# Patient Record
Sex: FEMALE | Race: WHITE | NOT HISPANIC OR LATINO | URBAN - METROPOLITAN AREA
[De-identification: names, ages, dates, MRNs, and addresses within clinical notes are randomized per-mention and may not be internally consistent; named-entity substitution may affect disease eponyms.]

---

## 2017-04-28 ENCOUNTER — OFFICE VISIT (OUTPATIENT)
Dept: OBGYN | Facility: CLINIC | Age: 47
End: 2017-04-28
Payer: COMMERCIAL

## 2017-04-28 ENCOUNTER — RADIANT APPOINTMENT (OUTPATIENT)
Dept: ULTRASOUND IMAGING | Facility: CLINIC | Age: 47
End: 2017-04-28
Attending: OBSTETRICS & GYNECOLOGY
Payer: COMMERCIAL

## 2017-04-28 VITALS
HEIGHT: 63 IN | DIASTOLIC BLOOD PRESSURE: 78 MMHG | SYSTOLIC BLOOD PRESSURE: 102 MMHG | WEIGHT: 207 LBS | BODY MASS INDEX: 36.68 KG/M2

## 2017-04-28 DIAGNOSIS — N84.1 ENDOCERVICAL POLYP: ICD-10-CM

## 2017-04-28 DIAGNOSIS — N93.8 DUB (DYSFUNCTIONAL UTERINE BLEEDING): ICD-10-CM

## 2017-04-28 DIAGNOSIS — R10.2 PELVIC CRAMPING: ICD-10-CM

## 2017-04-28 DIAGNOSIS — N83.201 CYST OF RIGHT OVARY: ICD-10-CM

## 2017-04-28 DIAGNOSIS — N93.8 DUB (DYSFUNCTIONAL UTERINE BLEEDING): Primary | ICD-10-CM

## 2017-04-28 PROCEDURE — 99202 OFFICE O/P NEW SF 15 MIN: CPT | Mod: 25 | Performed by: OBSTETRICS & GYNECOLOGY

## 2017-04-28 PROCEDURE — 76830 TRANSVAGINAL US NON-OB: CPT | Performed by: OBSTETRICS & GYNECOLOGY

## 2017-04-28 RX ORDER — MEDROXYPROGESTERONE ACETATE 10 MG
10 TABLET ORAL DAILY
Qty: 30 TABLET | Refills: 0 | Status: SHIPPED | OUTPATIENT
Start: 2017-04-28 | End: 2017-10-26

## 2017-04-28 ASSESSMENT — ANXIETY QUESTIONNAIRES
GAD7 TOTAL SCORE: 0
3. WORRYING TOO MUCH ABOUT DIFFERENT THINGS: NOT AT ALL
2. NOT BEING ABLE TO STOP OR CONTROL WORRYING: NOT AT ALL
6. BECOMING EASILY ANNOYED OR IRRITABLE: NOT AT ALL
1. FEELING NERVOUS, ANXIOUS, OR ON EDGE: NOT AT ALL
7. FEELING AFRAID AS IF SOMETHING AWFUL MIGHT HAPPEN: NOT AT ALL
IF YOU CHECKED OFF ANY PROBLEMS ON THIS QUESTIONNAIRE, HOW DIFFICULT HAVE THESE PROBLEMS MADE IT FOR YOU TO DO YOUR WORK, TAKE CARE OF THINGS AT HOME, OR GET ALONG WITH OTHER PEOPLE: NOT DIFFICULT AT ALL
5. BEING SO RESTLESS THAT IT IS HARD TO SIT STILL: NOT AT ALL

## 2017-04-28 ASSESSMENT — PATIENT HEALTH QUESTIONNAIRE - PHQ9: 5. POOR APPETITE OR OVEREATING: NOT AT ALL

## 2017-04-28 NOTE — PROGRESS NOTES
SUBJECTIVE:                                                   Yolis Paul is a 47 year old female who presents to clinic today for the following health issue(s):  Patient presents with:  Abnormal Uterine Bleeding: 3 weeks of bleeding, very heavy, patient used a menstral cup, that fills about 6-14 oz up, severe cramping, patient taking advil      HPI:  NP  Moved from [place] and prior to that [place].     Periods previously monthly, last 3-5d. Then last year skipped march, was in process of moving, thought due to stress. Then in April, started, slowed down, stop and then restart. This went on until September. Then just stopped and returned to monthly. Then 3wks ago started bleeding and has not stopped. Is heavy, crampy. Has tried Advil and this helps some. Has been bleeding through clothes.   In process of putting in menstrual cup, thinks discovered something coming out of uterus.       Patient's last menstrual period was 2017..   Patient is sexually active, No obstetric history on file..  Using none for contraception.    reports that she has never smoked. She has never used smokeless tobacco.    STD testing offered?  Declined - not sexually active    Health maintenance updated:  yes    Today's PHQ-2 Score: No flowsheet data found.  Today's PHQ-9 Score:   PHQ-9 SCORE 2017   Total Score 1     Today's AMINATA-7 Score:   AMINATA-7 SCORE 2017   Total Score 0       Problem list and histories reviewed & adjusted, as indicated.  Additional history: as documented.    There is no problem list on file for this patient.    Past Surgical History:   Procedure Laterality Date     ABDOMINOPLASTY        SECTION       GALLBLADDER SURGERY      L/S      Social History   Substance Use Topics     Smoking status: Never Smoker     Smokeless tobacco: Never Used     Alcohol use Yes      Problem (# of Occurrences) Relation (Name,Age of Onset)    Breast Cancer (1) Maternal Aunt (70)    Fractures (1) Mother (60): Hip.  "Smoker, chronic steriod use.     HEART DISEASE (2) Maternal Grandmother, Paternal Grandmother    Hypertension (2) Maternal Grandmother, Paternal Grandmother    OSTEOPOROSIS (1) Mother    Thyroid Disease (1) Father            Current Outpatient Prescriptions   Medication Sig     medroxyPROGESTERone (PROVERA) 10 MG tablet Take 1 tablet (10 mg) by mouth daily     No current facility-administered medications for this visit.      Allergies   Allergen Reactions     Codeine        ROS:  12 point review of systems negative other than symptoms noted below.  Cardiovascular: Lower Extremity Swelling  Genitourinary: Cramps, Frequency, Heavy Bleeding with Period, Night Sweats and Significant PMS    OBJECTIVE:     /78  Ht 5' 3\" (1.6 m)  Wt 207 lb (93.9 kg)  LMP 04/28/2017  Breastfeeding? No  BMI 36.67 kg/m2  Body mass index is 36.67 kg/(m^2).    Exam:  Constitutional:  Appearance: Well nourished, well developed alert, in no acute distress  Neurologic/Psychiatric:  Mental Status:  Oriented X3   Pelvic Exam:  External Genitalia:     Normal appearance for age, no discharge present, no tenderness present, no inflammatory lesions present, color normal  Vagina:     Normal vaginal vault without central or paravaginal defects, no discharge present, no inflammatory lesions present, no masses present  Bladder:     Nontender to palpation  Urethra:   Urethral Body:  Urethra palpation normal, urethra structural support normal   Urethral Meatus:  No erythema or lesions present  Cervix:     Appearance healthy, nontender to palpation, no bleeding present, [id number] POLYP PROTRUDING FROM OS; DARK BLOOD IN VAULT  Uterus:     Nontender to palpation, no masses present, position anteflexed, mobility: normal  Adnexa:     No adnexal tenderness present, no adnexal masses present  Perineum:     Perineum within normal limits, no evidence of trauma, no rashes or skin lesions present  Anus:     Anus within normal limits, no hemorrhoids " present  Inguinal Lymph Nodes:     No lymphadenopathy present  Pubic Hair:     Normal pubic hair distribution for age  Genitalia and Groin:     No rashes present, no lesions present, no areas of discoloration, no masses present       ASSESSMENT/PLAN:                                                        ICD-10-CM    1. DUB (dysfunctional uterine bleeding) N93.8 US Transvaginal Non OB     medroxyPROGESTERone (PROVERA) 10 MG tablet     US Transvaginal Non OB   2. Pelvic cramping R10.2 US Transvaginal Non OB     medroxyPROGESTERone (PROVERA) 10 MG tablet     US Transvaginal Non OB   3. Endocervical polyp N84.1 medroxyPROGESTERone (PROVERA) 10 MG tablet   4. Cyst of right ovary N83.201 US Transvaginal Non OB         -Pelvic US obtained. Ovarian cyst complex on R noted, no other evidence polyps/fibroids. Discussed monitoring cyst and repeating US in 6-8wk.  -endocervical polyp, large. Discussed in office vs surgical hysteroscopy and polypectomy. Is on larger side, would anticipate some bleeding could ocur which may require further intervention. Pt wishes to try in office. She will make appt.  -DUB, likely secondary to polyp +/-perimenopause. Will stop bleeding with provera in meantime, until can return for polyp removal.     Katelyn Joseph Masters, DO  [facility]

## 2017-04-28 NOTE — MR AVS SNAPSHOT
"              After Visit Summary   4/28/2017    Yolis Paul    MRN: [id number]           Patient Information     Date Of Birth          1970        Visit Information        Provider Department      4/28/2017 9:20 AM Katelyn Henderson DO [facility]        Today's Diagnoses     DUB (dysfunctional uterine bleeding)    -  1    Pelvic cramping        Endocervical polyp           Follow-ups after your visit        Your next 10 appointments already scheduled     May 04, 2017  2:50 PM CDT   Office Visit with Katelyn Henderson DO   [facility]    [address] [place] [id number] [phone]           Bring a current list of meds and any records pertaining to this visit.  For Physicals, please bring immunization records and any forms needing to be filled out.  Please arrive 10 minutes early to complete paperwork.              Who to contact     If you have questions or need follow up information about today's clinic visit or your schedule please contact [facility] directly at [phone].  Normal or non-critical lab and imaging results will be communicated to you by [employer], letter or phone within 4 business days after the clinic has received the results. If you do not hear from us within 7 days, please contact the clinic through [employer] or phone. If you have a critical or abnormal lab result, we will notify you by phone as soon as possible.  Submit refill requests through [employer] or call your pharmacy and they will forward the refill request to us. Please allow 3 business days for your refill to be completed.          Additional Information About Your Visit        MyChart Information     [employer] lets you send messages to your doctor, view your test results, renew your prescriptions, schedule appointments and more. To sign up, go to [email or web address] . Click on \"Log in\" on the left " "side of the screen, which will take you to the Welcome page. Then click on \"Sign up Now\" on the right side of the page.     You will be asked to enter the access code listed below, as well as some personal information. Please follow the directions to create your username and password.     Your access code is: [email or web address]  Expires: 2017 10:34 AM     Your access code will  in 90 days. If you need help or a new code, please call your [facility] [place] clinic or [phone].        Care EveryWhere ID     This is your Care EveryWhere ID. This could be used by other organizations to access your [facility] medical records  [id number]        Your Vitals Were     Height Last Period Breastfeeding? BMI (Body Mass Index)          5' 3\" (1.6 m) 2017 No 36.67 kg/m2         Blood Pressure from Last 3 Encounters:   17 102/78    Weight from Last 3 Encounters:   17 207 lb (93.9 kg)               Primary Care Provider    None Specified       No primary provider on file.        Thank you!     Thank you for choosing [facility]  for your care. Our goal is always to provide you with excellent care. Hearing back from our patients is one way we can continue to improve our services. Please take a few minutes to complete the written survey that you may receive in the mail after your visit with us. Thank you!             Your Updated Medication List - Protect others around you: Learn how to safely use, store and throw away your medicines at www.disposemymeds.org.      Notice  As of 2017 10:34 AM    You have not been prescribed any medications.      "

## 2017-04-29 ASSESSMENT — PATIENT HEALTH QUESTIONNAIRE - PHQ9: SUM OF ALL RESPONSES TO PHQ QUESTIONS 1-9: 1

## 2017-04-29 ASSESSMENT — ANXIETY QUESTIONNAIRES: GAD7 TOTAL SCORE: 0

## 2017-10-26 ENCOUNTER — APPOINTMENT (OUTPATIENT)
Dept: GENERAL RADIOLOGY | Facility: CLINIC | Age: 47
End: 2017-10-26
Attending: EMERGENCY MEDICINE
Payer: COMMERCIAL

## 2017-10-26 ENCOUNTER — HOSPITAL ENCOUNTER (OUTPATIENT)
Facility: CLINIC | Age: 47
Setting detail: OBSERVATION
Discharge: HOME | End: 2017-10-27
Attending: EMERGENCY MEDICINE | Admitting: INTERNAL MEDICINE
Payer: COMMERCIAL

## 2017-10-26 DIAGNOSIS — R07.9 CHEST PAIN: ICD-10-CM

## 2017-10-26 LAB
ANION GAP SERPL CALCULATED.3IONS-SCNC: 7 MMOL/L (ref 3–14)
BASOPHILS # BLD AUTO: 0 10E9/L (ref 0–0.2)
BASOPHILS NFR BLD AUTO: 0.5 %
BUN SERPL-MCNC: 11 MG/DL (ref 7–30)
CALCIUM SERPL-MCNC: 8.6 MG/DL (ref 8.5–10.1)
CHLORIDE SERPL-SCNC: 108 MMOL/L (ref 94–109)
CO2 SERPL-SCNC: 23 MMOL/L (ref 20–32)
CREAT SERPL-MCNC: 0.74 MG/DL (ref 0.52–1.04)
D DIMER PPP FEU-MCNC: 0.3 UG/ML FEU (ref 0–0.5)
DIFFERENTIAL METHOD BLD: NORMAL
EOSINOPHIL # BLD AUTO: 0 10E9/L (ref 0–0.7)
EOSINOPHIL NFR BLD AUTO: 0.6 %
ERYTHROCYTE [DISTWIDTH] IN BLOOD BY AUTOMATED COUNT: 14.1 % (ref 10–15)
GFR SERPL CREATININE-BSD FRML MDRD: 83 ML/MIN/1.7M2
GLUCOSE SERPL-MCNC: 88 MG/DL (ref 70–99)
HCT VFR BLD AUTO: 38 % (ref 35–47)
HGB BLD-MCNC: 13 G/DL (ref 11.7–15.7)
IMM GRANULOCYTES # BLD: 0 10E9/L (ref 0–0.4)
IMM GRANULOCYTES NFR BLD: 0.3 %
INTERPRETATION ECG - MUSE: NORMAL
LYMPHOCYTES # BLD AUTO: 1.8 10E9/L (ref 0.8–5.3)
LYMPHOCYTES NFR BLD AUTO: 28.4 %
MAGNESIUM SERPL-MCNC: 2.1 MG/DL (ref 1.6–2.3)
MCH RBC QN AUTO: 29.7 PG (ref 26.5–33)
MCHC RBC AUTO-ENTMCNC: 34.2 G/DL (ref 31.5–36.5)
MCV RBC AUTO: 87 FL (ref 78–100)
MONOCYTES # BLD AUTO: 0.5 10E9/L (ref 0–1.3)
MONOCYTES NFR BLD AUTO: 8.5 %
NEUTROPHILS # BLD AUTO: 3.9 10E9/L (ref 1.6–8.3)
NEUTROPHILS NFR BLD AUTO: 61.7 %
NRBC # BLD AUTO: 0 10*3/UL
NRBC BLD AUTO-RTO: 0 /100
PLATELET # BLD AUTO: 276 10E9/L (ref 150–450)
POTASSIUM SERPL-SCNC: 3.7 MMOL/L (ref 3.4–5.3)
RBC # BLD AUTO: 4.37 10E12/L (ref 3.8–5.2)
SODIUM SERPL-SCNC: 138 MMOL/L (ref 133–144)
TROPONIN I SERPL-MCNC: <0.015 UG/L (ref 0–0.04)
TROPONIN I SERPL-MCNC: <0.015 UG/L (ref 0–0.04)
TSH SERPL DL<=0.005 MIU/L-ACNC: 2.11 MU/L (ref 0.4–4)
WBC # BLD AUTO: 6.4 10E9/L (ref 4–11)

## 2017-10-26 PROCEDURE — G0378 HOSPITAL OBSERVATION PER HR: HCPCS

## 2017-10-26 PROCEDURE — 84443 ASSAY THYROID STIM HORMONE: CPT | Performed by: EMERGENCY MEDICINE

## 2017-10-26 PROCEDURE — 84484 ASSAY OF TROPONIN QUANT: CPT | Mod: 91 | Performed by: NURSE PRACTITIONER

## 2017-10-26 PROCEDURE — 85379 FIBRIN DEGRADATION QUANT: CPT | Performed by: EMERGENCY MEDICINE

## 2017-10-26 PROCEDURE — 99285 EMERGENCY DEPT VISIT HI MDM: CPT | Mod: 25

## 2017-10-26 PROCEDURE — 83735 ASSAY OF MAGNESIUM: CPT | Performed by: EMERGENCY MEDICINE

## 2017-10-26 PROCEDURE — 80048 BASIC METABOLIC PNL TOTAL CA: CPT | Performed by: EMERGENCY MEDICINE

## 2017-10-26 PROCEDURE — 99207 ZZC CDG-HISTORY COMP: MEETS EXP. PROBLEM FOCUSED-DOWN CODED LACK OF ROS: CPT | Performed by: NURSE PRACTITIONER

## 2017-10-26 PROCEDURE — 99218 ZZC INITIAL OBSERVATION CARE,LEVL I: CPT | Performed by: NURSE PRACTITIONER

## 2017-10-26 PROCEDURE — 25000132 ZZH RX MED GY IP 250 OP 250 PS 637: Performed by: EMERGENCY MEDICINE

## 2017-10-26 PROCEDURE — 93005 ELECTROCARDIOGRAM TRACING: CPT

## 2017-10-26 PROCEDURE — 36415 COLL VENOUS BLD VENIPUNCTURE: CPT | Performed by: NURSE PRACTITIONER

## 2017-10-26 PROCEDURE — 71020 XR CHEST 2 VW: CPT

## 2017-10-26 PROCEDURE — 84484 ASSAY OF TROPONIN QUANT: CPT | Performed by: EMERGENCY MEDICINE

## 2017-10-26 PROCEDURE — 85025 COMPLETE CBC W/AUTO DIFF WBC: CPT | Performed by: EMERGENCY MEDICINE

## 2017-10-26 RX ORDER — ALUMINA, MAGNESIA, AND SIMETHICONE 2400; 2400; 240 MG/30ML; MG/30ML; MG/30ML
15-30 SUSPENSION ORAL EVERY 4 HOURS PRN
Status: DISCONTINUED | OUTPATIENT
Start: 2017-10-26 | End: 2017-10-27 | Stop reason: HOSPADM

## 2017-10-26 RX ORDER — ASPIRIN 81 MG/1
324 TABLET, CHEWABLE ORAL ONCE
Status: COMPLETED | OUTPATIENT
Start: 2017-10-26 | End: 2017-10-26

## 2017-10-26 RX ORDER — ASPIRIN 81 MG/1
162 TABLET, CHEWABLE ORAL ONCE
Status: DISCONTINUED | OUTPATIENT
Start: 2017-10-26 | End: 2017-10-26

## 2017-10-26 RX ORDER — ACETAMINOPHEN 650 MG/1
650 SUPPOSITORY RECTAL EVERY 4 HOURS PRN
Status: DISCONTINUED | OUTPATIENT
Start: 2017-10-26 | End: 2017-10-27 | Stop reason: HOSPADM

## 2017-10-26 RX ORDER — ASPIRIN 81 MG/1
81 TABLET ORAL DAILY
Status: DISCONTINUED | OUTPATIENT
Start: 2017-10-27 | End: 2017-10-27 | Stop reason: HOSPADM

## 2017-10-26 RX ORDER — NALOXONE HYDROCHLORIDE 0.4 MG/ML
.1-.4 INJECTION, SOLUTION INTRAMUSCULAR; INTRAVENOUS; SUBCUTANEOUS
Status: DISCONTINUED | OUTPATIENT
Start: 2017-10-26 | End: 2017-10-27 | Stop reason: HOSPADM

## 2017-10-26 RX ORDER — ACETAMINOPHEN 325 MG/1
650 TABLET ORAL EVERY 4 HOURS PRN
Status: DISCONTINUED | OUTPATIENT
Start: 2017-10-26 | End: 2017-10-27 | Stop reason: HOSPADM

## 2017-10-26 RX ORDER — NITROGLYCERIN 0.4 MG/1
0.4 TABLET SUBLINGUAL EVERY 5 MIN PRN
Status: DISCONTINUED | OUTPATIENT
Start: 2017-10-26 | End: 2017-10-26

## 2017-10-26 RX ORDER — NITROGLYCERIN 0.4 MG/1
0.4 TABLET SUBLINGUAL EVERY 5 MIN PRN
Status: DISCONTINUED | OUTPATIENT
Start: 2017-10-26 | End: 2017-10-27 | Stop reason: HOSPADM

## 2017-10-26 RX ADMIN — ASPIRIN 81 MG 324 MG: 81 TABLET ORAL at 13:50

## 2017-10-26 RX ADMIN — NITROGLYCERIN 0.4 MG: 0.4 TABLET SUBLINGUAL at 13:51

## 2017-10-26 ASSESSMENT — ENCOUNTER SYMPTOMS
DIZZINESS: 1
NAUSEA: 1
ABDOMINAL PAIN: 0
DIAPHORESIS: 0
BACK PAIN: 1
VOMITING: 0
NECK PAIN: 1
PALPITATIONS: 1

## 2017-10-26 NOTE — IP AVS SNAPSHOT
MRN:[id number]                      After Visit Summary   10/26/2017    Yolis Paul    MRN: [id number]           Thank you!     Thank you for choosing [facility] for your care. Our goal is always to provide you with excellent care. Hearing back from our patients is one way we can continue to improve our services. Please take a few minutes to complete the written survey that you may receive in the mail after you visit with us. Thank you!        Patient Information     Date Of Birth          1970        About your hospital stay     You were [facility] on:  October 26, 2017 [facility]    You were discharged on:  October 27, 2017        Reason for your hospital stay       You were registered to observation due to chest pain.                  Who to Call     For medical emergencies, please call 911.  For non-urgent questions about your medical care, please call your primary care provider or clinic, None          Attending Provider     Provider Specialty    Fernando Barnett MD --    Chelsi Fonseca MD Internal Medicine       Primary Care Provider    Physician No Ref-Primary      After Care Instructions     Activity       Your activity upon discharge: activity as tolerated            Diet       Follow this diet upon discharge: Orders Placed This Encounter      Combination Diet Low Saturated Fat Na <2400mg Diet, No Caffeine Diet                  Follow-up Appointments     Follow-up and recommended labs and tests        Follow up with primary care provider, Physician No Ref-Primary, within 7 days for hospital follow- up.  No follow up labs or test are needed.                  Pending Results     No orders found for last 3 day(s).            Statement of Approval     Ordered          10/27/17 1146  I have reviewed and agree with all the recommendations and orders detailed in this document.  EFFECTIVE NOW     Approved and electronically signed by:   "Beck Chun PA-C             Admission Information     Date & Time Provider Department Dept. Phone    10/26/2017 Chelsi Fonseca MD [facility] [phone]      Your Vitals Were     Blood Pressure Pulse Temperature Respirations Height Weight    124/89 (BP Location: Right arm) 94 98.8  F (37.1  C) (Oral) 16 1.626 m (5' 4\") 89.8 kg (198 lb)    Pulse Oximetry BMI (Body Mass Index)                94% 33.99 kg/m2          [employer] Information     [employer] lets you send messages to your doctor, view your test results, renew your prescriptions, schedule appointments and more. To sign up, go to [email or web address] . Click on \"Log in\" on the left side of the screen, which will take you to the Welcome page. Then click on \"Sign up Now\" on the right side of the page.     You will be asked to enter the access code listed below, as well as some personal information. Please follow the directions to create your username and password.     Your access code is: [email or web address]  Expires: 2018 11:54 AM     Your access code will  in 90 days. If you need help or a new code, please call your [facility] clinic or [phone].        Care EveryWhere ID     This is your Care EveryWhere ID. This could be used by other organizations to access your [facility] medical records  [id number]        Equal Access to Services     AZAEL MCNAIR AH: Hadii anaya demarcoo Sorobertali, waaxda luqadaha, qaybta kaalmada almaz, rajan kasper aderoselyn mccarthy . So [facility] [phone].    ATENCIÓN: Si habla español, tiene a ross disposición servicios gratuitos de asistencia lingüística. Llame al [phone].    We comply with applicable federal civil rights laws and [place] laws. We do not discriminate on the basis of race, color, national origin, age, disability, sex, sexual orientation, or gender identity.               Review of your medicines      CONTINUE these medicines which have NOT CHANGED        " Dose / Directions    IBUPROFEN PO        Dose:  200 mg   Take 200 mg by mouth every 8 hours as needed for moderate pain   Refills:  0                Protect others around you: Learn how to safely use, store and throw away your medicines at www.disposemymeds.org.             Medication List: This is a list of all your medications and when to take them. Check marks below indicate your daily home schedule. Keep this list as a reference.      Medications           Morning Afternoon Evening Bedtime As Needed    IBUPROFEN PO   Take 200 mg by mouth every 8 hours as needed for moderate pain

## 2017-10-26 NOTE — H&P
"[facility]    History and Physical  Hospitalist       Date of Admission:  10/26/2017    Assessment & Plan   Yolis Paul is a 47 year old female who presents with chest pain and a PMH of atrial fibrillation.    Chest Pain: the patient reports this chest pain came on suddenly, while the patient was sitting at work. The pain is located mid sternal, radiated to her back, up the right side of her neck and caused pain/numbness down her right arm. The pain continued to worsen, and at its worst was a 7-8/10. She states she has not had anything like this in the past. The pain was associated with nausea, slight lightheadedness, and she felt a little clammy. She endorses increased work stress recently, but there was not a preceding factor or incident to this pain. While this episode was occurring, she was able to have her BP taken which was elevated at 160/105, HR 115s. The pain was alleviated by nitro in the ED. Of note, the 2 days preceding this event, the patient noticed she was having more palpitations than usual. Electrolytes normal. D-dimer normal, CXR normal.  -telemetry  -exercise stress test 10/27  -trend troponins over night  -TSH  -magnesium add on    Atrial Fibrillation: The patient reports she has had one episode of atrial fibrillation approximately 8 years ago. At this time, she was taking an OTC herbal supplement for depression called \"Maco-E\". Apparently a side effect of this medication is atrial fibrillation/arrhythmias. She was hospitalized overnight and stopped taking the medication and subsequently converted on her own without treatment and has not had an episode since.   -telemetry  -TSH/magnesium    DVT Prophylaxis: Low Risk/Ambulatory with no VTE prophylaxis indicated and Pneumatic Compression Devices  Code Status: Full Code    Disposition: Expected discharge in 1 days once stress test is complete, hemodynamically stable.    Jane Coley, CNP    Primary Care Physician   No primary care " provider on file.    Chief Complaint    Chest Pain    History is obtained from the patient    History of Present Illness   Yolis Paul is a 47 year old female who presents with sudden onset chest pain while sitting at a desk at work. The pain is located mid sternal, radiated to her back, up the right side of her neck and caused pain/numbness down her right arm. The pain continued to worsen, and at its worst was a 7-8/10. She states she has not had anything like this in the past. The pain was associated with nausea, slight lightheadedness, and she felt a little clammy. She endorses increased work stress recently, but there was not a preceding factor or incident to this pain. While this episode was occurring, she was able to have her BP taken which was elevated at 160/105, HR 115s. The pain was alleviated by nitro in the ED. Of note, the 2 days preceding this event, the patient noticed she was having more palpitations than usual.    Past Medical History    Atrial fibrillation    Past Surgical History   Abdominoplasty    section  Gallbladder surgery     Prior to Admission Medications   Prior to Admission Medications   Prescriptions Last Dose Informant Patient Reported? Taking?   IBUPROFEN PO prn  Yes Yes   Sig: Take 200 mg by mouth every 8 hours as needed for moderate pain      Facility-Administered Medications: None     Allergies   Allergies   Allergen Reactions     Codeine        Social History   I have reviewed this patient's social history and updated it with pertinent information if needed. Yoils Paul  reports that she has never smoked. She has never used smokeless tobacco. She reports that she drinks alcohol. She reports that she does not use illicit drugs.    Family History   I have reviewed this patient's family history and updated it with pertinent information if needed.   Family History   Problem Relation Age of Onset     OSTEOPOROSIS Mother      Fractures Mother 60     Hip. Smoker, chronic steriod  "use.      Breast Cancer Maternal Aunt 70     Thyroid Disease Father      Hypertension Maternal Grandmother      HEART DISEASE Maternal Grandmother      Hypertension Paternal Grandmother      HEART DISEASE Paternal Grandmother        Review of Systems   CONSTITUTIONAL:  negative for  fevers and chills POSITIVE for occasional \"sweating\" - associates this with menopause  RESPIRATORY:  negative for  dry cough, cough with sputum and dyspnea  CARDIOVASCULAR:  negative for  dyspnea, orthopnea, exertional chest pressure/discomfort, fatigue POSITIVE for chest pain; palpitations  GASTROINTESTINAL:  positive for nausea  GENITOURINARY:  negative  BEHAVIOR/PSYCH:  positive for increased stress    Physical Exam   Temp: 100.2  F (37.9  C) Temp src: Oral BP: 124/87 Pulse: 94 Heart Rate: 80 Resp: 19 SpO2: 96 % O2 Device: None (Room air)    Vital Signs with Ranges  Temp:  [100.2  F (37.9  C)] 100.2  F (37.9  C)  Pulse:  [94] 94  Heart Rate:  [72-93] 80  Resp:  [10-22] 19  BP: (108-139)/(71-93) 124/87  SpO2:  [92 %-96 %] 96 %  198 lbs 0 oz    Constitutional: Awake, alert, cooperative, no apparent distress. Sitting upright in bed, eyes open  Eyes: Conjunctiva and pupils examined and normal.  Respiratory: Clear to auscultation bilaterally, no crackles or wheezing.  Cardiovascular: Regular rate and rhythm, normal S1 and S2, and no murmur noted.  GI: Soft, non-distended, non-tender, normal bowel sounds.  Skin: No rashes  Neurologic: no gross focal neuro deficits, normal strength and sensation.  Psychiatric: Alert, oriented to person, place and time, no obvious anxiety or depression.    Data   Data reviewed today:  I personally reviewed the EKG tracing showing sinus tachycardia.    Recent Labs  Lab 10/26/17  1338   WBC 6.4   HGB 13.0   MCV 87         POTASSIUM 3.7   CHLORIDE 108   CO2 23   BUN 11   CR 0.74   ANIONGAP 7   OLIVIA 8.6   GLC 88   TROPI <0.015       Imaging:  Recent Results (from the past 24 hour(s))   Chest XR,  PA & " LAT    Narrative    XR CHEST 2 VW  10/26/2017 1:50 PM    HISTORY:  chest pain    COMPARISON:  None.      Impression    IMPRESSION:  Negative.     BASSAM JONAS MD

## 2017-10-26 NOTE — ED PROVIDER NOTES
History     Chief Complaint:  Chest Pain    HPI   Yolis Paul is a 47 year old female who presents to the emergency department today for evaluation of chest pain. The patient reports 7/10 chest pain radiating up her neck and to her back with nausea and dizziness, increased blood pressure of 170/110, and increased heart rate starting two hours ago while she was sitting at work. The patient reports similar pain last night with heart skipping palpitations. The patient reports an episode of atrial fibrillation in [id number] from taking medication after her mother passed away, but did not present with chest pain and she reports at that time she cardioverted back on her own. The patient reports pain right now is a 3/10 and there is only mild neck discomfort. The patient reports she has not been admitted or seen for current complaints before. The patient endorses surgery of  section in . The patient drinks alcohol weekly and denies smoking history. The patient denies diaphoresis, leg pain, abdominal pain, or vomiting.    Allergies:  Codeine    Medications:    Provera  Ibuprofen    Past Medical History:    Atrial fibrillation    Past Surgical History:    Abdominoplasty    section  Gallbladder surgery     Family History:    Mother: Osteoporosis, hip fracture  Maternal Aunt: Breast Cancer  Father: Thyroid disease  Maternal Grandmother: Hypertension, Heart Disease  Paternal Grandmother: Hypertension, Heart Disease    Social History:  The patient was accompanied to the ED by colleague.  Smoking Status: Never Smoker  Smokeless Tobacco: Never Used  Alcohol Use: Positive  Marital Status:   [2]     Review of Systems   Constitutional: Negative for diaphoresis.   Cardiovascular: Positive for chest pain and palpitations (resolved). Negative for leg swelling.   Gastrointestinal: Positive for nausea. Negative for abdominal pain and vomiting.   Musculoskeletal: Positive for back pain (radiating from chest pain)  "and neck pain (radiating from chest pain).        No leg pain   Neurological: Positive for dizziness.   All other systems reviewed and are negative.    Physical Exam     Patient Vitals for the past 24 hrs:   BP Temp Temp src Pulse Heart Rate Resp SpO2 Height Weight   10/26/17 1500 124/77 - - - 76 15 94 % - -   10/26/17 1445 113/71 - - - 80 22 94 % - -   10/26/17 1430 121/75 - - - 85 16 95 % - -   10/26/17 1415 108/84 - - - 86 12 95 % - -   10/26/17 1400 (!) 127/93 - - - 93 13 92 % - -   10/26/17 1312 139/87 100.2  F (37.9  C) Oral 94 - 22 95 % 1.626 m (5' 4\") 89.8 kg (198 lb)     Physical Exam  General: Appears well-developed and well-nourished.   Head: No signs of trauma.   Mouth/Throat: Oropharynx is clear and moist.   CV: Normal rate and regular rhythm.    Resp: Effort normal and breath sounds normal. No respiratory distress.   GI: Soft. There is no tenderness or guarding.  Normal bowel sounds.  No CVA tenderness.  MSK: Normal range of motion. no edema. No Calf tenderness.  Neuro: The patient is alert and oriented.  Speech normal.  GCS 15  Skin: Skin is warm and dry. No rash noted.   Psych: normal mood and affect. behavior is normal.       Emergency Department Course     ECG:  ECG taken at 1305, ECG read at 1324  Sinu tachycardia  Otherwise normal ECG  Rate 110 bpm. NC interval 152 ms. QRS duration 78 ms. QT/QTc 312/422 ms. P-R-T axes 33 15 16.    Imaging:  Radiology findings were communicated with the patient who voiced understanding of the findings.    Chest XR,  PA & LAT  1. Negative.   BASSAM JONAS MD  Reading per radiology    Laboratory:  Laboratory findings were communicated with the patient who voiced understanding of the findings.    CBC: WBC 6.4, HGB 13.0,   BMP: WNL (Creatinine 0.74)  Troponin (Collected 1338): <0.015  D Dimer (Collected 1338): 0.3    Interventions:  1350 Aspirin 324 mg PO  1351 Nitroglycerin 0.4 mg Sublingual     Emergency Department Course:    1312 Nursing notes and vitals " reviewed.    1320 I performed an exam of the patient as documented above.     1349 The patient was sent for a Chest XR,  PA & LAT while in the emergency department, results above.     1419 I discussed the treatment plan with the patient. They expressed understanding of this plan and consented to admission. I personally reviewed the ECG, laboratory, and imaging results with the patient and answered all related questions prior to admission.    1455 I discussed the patient with Dr. Fonseca, who will admit the patient to a monitored bed for further evaluation and treatment.     Impression & Plan      Medical Decision Making:  Yolis Paul is a 47 year old female who presents to the emergency department today for evaluation of chest pain. Chest pain began this morning while she was at work with some radiation to the jaw.  She has no prior cardiac history. ECG is obtained that did not show any concerning ST segment changes or arrhythmias. Labs were obtained that showed negative troponin along with a negative d-dimer.  Overall, I have low suspicion for pulmonary embolism.  She was given aspirin and nitroglycerin with resolution of her pain.  Given the radiation of her symptoms along with it being the weekend soon, she'll be admitted for further cardiac evaluation and rule out.  She's remained stable throughout her time in the ER.    Diagnosis:    ICD-10-CM    1. Chest pain R07.9 TSH with free T4 reflex     TSH with free T4 reflex     CANCELED: TSH with free T4 reflex      Disposition:   The patient is admitted into the care of Dr. Fonseca.    Scribe Disclosure:  Gerber RANDLE, am serving as a scribe at 1:20 PM on 10/26/2017 to document services personally performed by Fernando Barnett MD based on my observations and the provider's statements to me.     EMERGENCY DEPARTMENT       Fernando Barnett MD  10/30/17 9190

## 2017-10-26 NOTE — PHARMACY-ADMISSION MEDICATION HISTORY
Admission medication history interview status for the 10/26/2017  admission is complete. See EPIC admission navigator for prior to admission medications     Medication history source reliability:Good    Actions taken by pharmacist (provider contacted, etc):None     Additional medication history information not noted on PTA med list :None    Medication reconciliation/reorder completed by provider prior to medication history? No    Time spent in this activity: 5min    Prior to Admission medications    Medication Sig Last Dose Taking? Auth Provider   IBUPROFEN PO Take 200 mg by mouth every 8 hours as needed for moderate pain prn Yes Unknown, Entered By History

## 2017-10-26 NOTE — PLAN OF CARE
Problem: Patient Care Overview  Goal: Plan of Care/Patient Progress Review  Outcome: Improving  Pt denies chest pain. Independent in room, VSS. Telemetry monitored. Troponin to be drawn at 2000, and 0200. Plan for stress test in the a.m. Will continue to monitor.

## 2017-10-26 NOTE — ED NOTES
[facility]  ED Nurse Handoff Report    ED Chief complaint: Chest Pain (Started 2 hours ago with radiation to jaw and back with nausea and dizziness. Pt states she had palpitations last night.)      ED Diagnosis:   Final diagnoses:   None       Code Status: not addressed in ED    Allergies:   Allergies   Allergen Reactions     Codeine        Activity level - Baseline/Home:  Independent    Activity Level - Current:   Independent     Needed?: No    Isolation: No  Infection: Not Applicable    Bariatric?: No    Vital Signs:   Vitals:    10/26/17 1400 10/26/17 1415 10/26/17 1430 10/26/17 1445   BP: (!) 127/93 108/84 121/75 113/71   Pulse:       Resp: 13 12 16 22   Temp:       TempSrc:       SpO2: 92% 95% 95% 94%   Weight:       Height:           Cardiac Rhythm: ,        Pain level: 0-10 Pain Scale: 2    Is this patient confused?: No, but poor historian    Patient Report: Initial Complaint: chest and neck pain   Focused Assessment: chest and lateral neck pain started while at work sitting , states never had this before ,   Tests Performed: labs, cxr  Abnormal Results: none   Treatments provided: nitro pone, pain free after, asa 324     Family Comments:  here     OBS brochure/video discussed/provided to patient: Yes    ED Medications:   Medications   nitroGLYcerin (NITROSTAT) sublingual tablet 0.4 mg (0.4 mg Sublingual Given 10/26/17 1351)   aspirin chewable tablet 324 mg (324 mg Oral Given 10/26/17 1350)       Drips infusing?:  No      ED NURSE PHONE NUMBER: [phone]

## 2017-10-26 NOTE — IP AVS SNAPSHOT
[facility] Observation Unit    [address] [place] [address]    Phone:  [phone]                                       After Visit Summary   10/26/2017    Yolis Paul    MRN: [id number]           After Visit Summary Signature Page     I have received my discharge instructions, and my questions have been answered. I have discussed any challenges I see with this plan with the nurse or doctor.    ..........................................................................................................................................  Patient/Patient Representative Signature      ..........................................................................................................................................  Patient Representative Print Name and Relationship to Patient    ..................................................               ................................................  Date                                            Time    ..........................................................................................................................................  Reviewed by Signature/Title    ...................................................              ..............................................  Date                                                            Time

## 2017-10-27 ENCOUNTER — APPOINTMENT (OUTPATIENT)
Dept: NUCLEAR MEDICINE | Facility: CLINIC | Age: 47
End: 2017-10-27
Attending: NURSE PRACTITIONER
Payer: COMMERCIAL

## 2017-10-27 VITALS
HEIGHT: 64 IN | OXYGEN SATURATION: 94 % | WEIGHT: 198 LBS | TEMPERATURE: 98.8 F | DIASTOLIC BLOOD PRESSURE: 89 MMHG | HEART RATE: 94 BPM | BODY MASS INDEX: 33.8 KG/M2 | SYSTOLIC BLOOD PRESSURE: 124 MMHG | RESPIRATION RATE: 16 BRPM

## 2017-10-27 LAB — TROPONIN I SERPL-MCNC: <0.015 UG/L (ref 0–0.04)

## 2017-10-27 PROCEDURE — 93016 CV STRESS TEST SUPVJ ONLY: CPT | Performed by: INTERNAL MEDICINE

## 2017-10-27 PROCEDURE — 99217 ZZC OBSERVATION CARE DISCHARGE: CPT | Performed by: PHYSICIAN ASSISTANT

## 2017-10-27 PROCEDURE — A9502 TC99M TETROFOSMIN: HCPCS | Performed by: INTERNAL MEDICINE

## 2017-10-27 PROCEDURE — 84484 ASSAY OF TROPONIN QUANT: CPT | Performed by: INTERNAL MEDICINE

## 2017-10-27 PROCEDURE — 34300033 ZZH RX 343: Performed by: INTERNAL MEDICINE

## 2017-10-27 PROCEDURE — G0378 HOSPITAL OBSERVATION PER HR: HCPCS

## 2017-10-27 PROCEDURE — 93017 CV STRESS TEST TRACING ONLY: CPT

## 2017-10-27 PROCEDURE — 36415 COLL VENOUS BLD VENIPUNCTURE: CPT | Performed by: INTERNAL MEDICINE

## 2017-10-27 PROCEDURE — 25000132 ZZH RX MED GY IP 250 OP 250 PS 637: Performed by: NURSE PRACTITIONER

## 2017-10-27 PROCEDURE — 93018 CV STRESS TEST I&R ONLY: CPT | Performed by: INTERNAL MEDICINE

## 2017-10-27 PROCEDURE — 78452 HT MUSCLE IMAGE SPECT MULT: CPT

## 2017-10-27 PROCEDURE — 78452 HT MUSCLE IMAGE SPECT MULT: CPT | Mod: 26 | Performed by: INTERNAL MEDICINE

## 2017-10-27 RX ADMIN — TETROFOSMIN 11 MCI.: 1.38 INJECTION, POWDER, LYOPHILIZED, FOR SOLUTION INTRAVENOUS at 07:10

## 2017-10-27 RX ADMIN — ASPIRIN 81 MG: 81 TABLET, COATED ORAL at 10:18

## 2017-10-27 RX ADMIN — TETROFOSMIN 34.6 MCI.: 1.38 INJECTION, POWDER, LYOPHILIZED, FOR SOLUTION INTRAVENOUS at 08:59

## 2017-10-27 NOTE — PLAN OF CARE
Problem: Patient Care Overview  Goal: Plan of Care/Patient Progress Review  Outcome: Improving  A&Ox4. VSS on RA. Up ind. Troponin negative x3. Denies chest pain/pressure. Plans for stress test this am.           Observation goals PRIOR TO DISCHARGE     Comments: List all goals to be met before discharge home:   - Serial troponins and stress test complete.- not met  - Seen and cleared by consultant if applicable- not met  - Adequate pain control on oral analgesia- met  - Vital signs normal or at patient baseline- not   - Safe disposition plan has been identified - not met  - Nurse to notify provider when observation goals have been met and patient is ready for discharge.

## 2017-10-27 NOTE — PLAN OF CARE
Problem: Patient Care Overview  Goal: Plan of Care/Patient Progress Review  Outcome: Improving     Observation goals PRIOR TO DISCHARGE     Comments: List all goals to be met before discharge home:   - Serial troponins and stress test complete.- not met  - Seen and cleared by consultant if applicable- not met  - Adequate pain control on oral analgesia- met  - Vital signs normal or at patient baseline- not   - Safe disposition plan has been identified - not met  - Nurse to notify provider when observation goals have been met and patient is ready for discharge.

## 2017-10-27 NOTE — DISCHARGE SUMMARY
PRIMARY CARE PROVIDER:  None.        DATE OF ADMISSION:  10/26/2017.      DATE OF DISCHARGE: 10/27/2017.      DISCHARGE DIAGNOSES:   1.  Chest pain.   2.  Paroxysmal atrial fibrillation.      DISCHARGE MEDICATIONS:       Review of your medicines      CONTINUE these medicines which have NOT CHANGED       Dose / Directions    IBUPROFEN PO        Dose:  200 mg   Take 200 mg by mouth every 8 hours as needed for moderate pain   Refills:  0              ALLERGIES:  Codeine.      DISPOSITION:  Home.      FOLLOWUP WITH RECOMMENDATIONS:  Yolis Paul should follow up with primary care provider within 1 week for hospital followup.  After discussion with the patient and patient's , patient does not have a primary care provider, offered assistance in establishing primary care service; however, patient and patient's  declined and said that they were capable of setting up an appointment with a primary care provider at [facility].      ACTIVITY:  As tolerated.      DIET:  Recommend low-saturated fat, low-salt diet with minimal caffeine.      CONSULTS:  None.      LABORATORY, IMAGING AND PROCEDURES:   1.  Routine laboratory studies that included CBC with platelet differential, basic metabolic panel, serial troponins, D-dimer, TSH with free T4 and magnesium level.   2.  Two-view chest x-ray.   3.  EKG.   4.  Repeat EKG.   5.  Nuclear medicine exercise stress test.      PENDING RESULTS:  None.      PHYSICAL EXAMINATION ON DAY OF DISCHARGE:   VITAL SIGNS:  Temperature is 98.8 degrees Fahrenheit with a blood pressure 124/89, heart rate of 81 beats per minute, respiratory rate of 16, O2 saturation 94% on room air.  The patient was denying any pain.   GENERAL:  The patient is awake, alert, cooperative, in no apparent distress, alert and oriented x3.   HEENT:  Head is normocephalic, atraumatic.  Moist mucous membranes present.  No exudates noted in the posterior pharynx.  Uvula is midline.   NECK:  Supple, normal  range of motion, no tracheal deviation, no cervical lymphadenopathy present.   CARDIOVASCULAR:  Regular rate and rhythm, no rubs, murmurs or gallops appreciated.   PULMONARY:  Lungs are clear to auscultation bilaterally, no wheezes, rhonchi or rales appreciated.   GASTROINTESTINAL:  Bowel sounds are present in all 4 quadrants, soft, nontender, nondistended, obese.   NEUROLOGIC:  Cranial nerves II-XII are grossly intact.  The patient is seen moving all 4 extremities without any difficulty and ambulating without ataxia.   EXTREMITIES:  No lower extremity edema noted bilaterally.  Calves are nontender to palpation.      HOSPITAL COURSE:  Yolis Paul is a 47-year-old female with a past medical history significant for paroxysmal atrial fibrillation who was registered to observation due to chest pain.      For full details, please read H&P as written by nurse practitioner, Jane Coley.      HOSPITAL COURSE:   1.  Chest pain:  The patient had presented due to acute onset of chest pain that occurred while working.  It was located in the midsternal area with radiation into her back and up to the right side of her neck with associated right arm numbness.  The patient's workup included negative D-dimer, chest x-ray that was unremarkable, serial troponins remained negative and monitoring on continuous telemetry that remained in normal sinus rhythm.  Exercise stress test was performed today and showed no areas of ischemia and was a negative test.  TSH level was within normal limits and magnesium level was within normal limits.  The patient was discharged home with a discussion to follow up with primary care provider and establish care with primary care provider in the next week.   2.  Paroxysmal atrial fibrillation:  This appears to have occurred approximately 8 years ago.  This occurred while patient was taking an over-the-counter herbal supplement for depression and once this was stopped, she had no further issues.  She was  monitored on telemetry which remained in normal sinus rhythm and TSH and magnesium levels were within normal limits.      CODE STATUS:  Full code.      The patient was discussed with Dr. Gena Romero, who agrees with discharge at this time.  Dr. Romero will evaluate the patient independently.      Total discharge time less than 30 minutes.         GENA ROMERO DO       As dictated by MAURI PEREZ PA-C            D: 10/27/2017 15:08   T: 10/27/2017 15:21   MT: MALCOM      Name:     TALON MATT   MRN:      [id number]        Account:        [id number]   :      1970           Admit Date:     [id number]                                  Discharge Date: 10/27/2017      Document: [id number]

## 2017-10-27 NOTE — PLAN OF CARE
Problem: Patient Care Overview  Goal: Discharge Needs Assessment  Outcome: Completed Date Met:  10/27/17  Pt discharged to home via private car accompanied by spouse. Pt states understanding of discharge instructions. Pt will set up PCP on her own,

## 2017-11-15 ENCOUNTER — TELEPHONE (OUTPATIENT)
Dept: OTHER | Facility: CLINIC | Age: 47
End: 2017-11-15

## 2017-11-15 DIAGNOSIS — R07.9 CHEST PAIN: Primary | ICD-10-CM

## 2017-11-15 NOTE — TELEPHONE ENCOUNTER
Clinical Product Navigator RN reviewed chart; patient on payer product coverage.  Review results: Met referral criteria for Care Coordinator; referral to be sent.    Pt was recently IP and needs to establish primary care.  Pt refused inpatient's team assistance in doing so and there is no appointment scheduled.    Marcie Mo RN/Clinical Product Navigator

## 2017-11-20 ENCOUNTER — CARE COORDINATION (OUTPATIENT)
Dept: CARE COORDINATION | Facility: CLINIC | Age: 47
End: 2017-11-20

## 2017-11-20 NOTE — PROGRESS NOTES
Clinic Care Coordination Contact  [facility]/Voicemail    Referral Source: Other, specify (CPN)  Clinical Data: Care Coordinator Outreach  Outreach attempted x 1.  Left message on voicemail with call back information and requested return call.  Plan: Care Coordinator will try to reach patient again in 1-2 business days.

## 2017-11-20 NOTE — LETTER
Health Care Home - Access Care Plan    About Me  Patient Name:  Yolis Paul    YOB: 1970  Age:                            47 year old   Jeremy MRN:         [id number] Telephone Information:     Home Phone [phone]   Mobile [phone]       Address:    [address]  [place] [address] Email address:  No e-mail address on record      Emergency Contact(s)  Name Relationship Lgl Grd Work Phone Home Phone Mobile Phone   1MARCELO SAEED Spouse    [phone]             Health Maintenance: Routine Health maintenance Reviewed: Due/Overdue     My Access Plan  Medical Emergency 911   Questions or concerns during clinic hours Primary Clinic Line, I will call the clinic directly: Primary Clinic: [facility] [phone]   24 Hour Appointment Line [phone] or  [phone])  (toll free)   24 Hour Nurse Line [phone] (toll free)   Questions or concerns outside clinic hours 24 Hour Appointment Line, I will call the after-hours on-call line:   [facility] [phone] or [phone]) (toll-free)   Preferred Urgent Care Preferred Urgent Care: Other   Preferred Hospital Preferred Hospital: [facility]  [phone]   Preferred Pharmacy [facility] [address], [address]     Behavioral Health Crisis Line The National Suicide Prevention Lifeline at 1-721.921.4080 or 911     My Care Team Members  Patient Care Team       Relationship Specialty Notifications Start End    No Ref-Primary, Physician PCP - General   10/26/17      NO REF-PRIMARY PHYSICIAN    None     10/26/17     Loraine Segovia, RN Clinic Care Coordinator Nurse  11/20/17     Phone: [phone] Fax: [phone]            My Medical and Care Information  Problem List   There is no problem list on file for this patient.     Current Medications and Allergies:  See printed Medication Report

## 2017-11-21 NOTE — PROGRESS NOTES
Clinic Care Coordination Contact  Care Team Conversations     Placed call to pt, she stated that she had gotten message left yesterday and understood reason for call and writer's role. She declined to est care with PCP inside or outside [facility] at this time.  Encouraged pt to call writer if she changes her mind in the future.  Pt verbalized understanding. Will close to clinic CC at this time.